# Patient Record
(demographics unavailable — no encounter records)

---

## 2024-11-19 NOTE — DISCUSSION/SUMMARY
[de-identified] : Discussed importance of non-impact exercise and muscle stretching before and after exercise. Reviewed x-rays Explained the importance of ice and rest. Stretching exercises shown, Explained the importance of Range of Motion before strength.   DISCUSSED NEED FOR PROPHYLACTIC ABX PRIOR TO ALL DENTAL WORK AND CLEANINGS FOR LIFE   Continue home strengthening and stretching program consisting of non-impact exercises and ice as needed. Return to office 9 months

## 2024-11-19 NOTE — HISTORY OF PRESENT ILLNESS
[de-identified] : 11/19/24  states he is doing better s/p left JACQUES 8/26/24.  finished with OPPT,  using advil prn.  c/o continued low back pain.    10/04/24 Follow up S/P LT JACQUES 08/26/2024. Ambulating with cane. Has been going to PT 2x a week and has had about 7 sessions so far. States he has been walking a lot on his own. Denies pain medication. Just having some stiffness in the morning that goes away once he starts moving. His left hip is doing well.  Patient does note low back pain. Had an x ray at a previous visit. He states that he has had this pain for years. He denies any bladder or bowel changes. Patient denies any radicular symptoms.

## 2024-11-19 NOTE — PHYSICAL EXAM
[5___] : adduction 5[unfilled]/5 [2+] : posterior tibialis pulse: 2+ [] : patient ambulates without assistive device [Left] : left hip with pelvis [AP] : anteroposterior [Lateral] : lateral [[email or web address]] : Well-aligned, well-fixed prosthesis. No lucency noted